# Patient Record
Sex: MALE | Race: WHITE | Employment: OTHER | URBAN - METROPOLITAN AREA
[De-identification: names, ages, dates, MRNs, and addresses within clinical notes are randomized per-mention and may not be internally consistent; named-entity substitution may affect disease eponyms.]

---

## 2019-05-30 ENCOUNTER — APPOINTMENT (OUTPATIENT)
Dept: CT IMAGING | Age: 59
End: 2019-05-30
Attending: PHYSICIAN ASSISTANT
Payer: COMMERCIAL

## 2019-05-30 ENCOUNTER — HOSPITAL ENCOUNTER (EMERGENCY)
Age: 59
Discharge: HOME | End: 2019-05-30
Attending: EMERGENCY MEDICINE
Payer: COMMERCIAL

## 2019-05-30 ENCOUNTER — APPOINTMENT (OUTPATIENT)
Dept: GENERAL RADIOLOGY | Age: 59
End: 2019-05-30
Attending: PHYSICIAN ASSISTANT
Payer: COMMERCIAL

## 2019-05-30 VITALS
HEIGHT: 71 IN | WEIGHT: 160 LBS | SYSTOLIC BLOOD PRESSURE: 148 MMHG | RESPIRATION RATE: 16 BRPM | OXYGEN SATURATION: 100 % | HEART RATE: 63 BPM | TEMPERATURE: 98.3 F | DIASTOLIC BLOOD PRESSURE: 93 MMHG | BODY MASS INDEX: 22.4 KG/M2

## 2019-05-30 DIAGNOSIS — M10.9 ACUTE GOUT OF RIGHT ELBOW, UNSPECIFIED CAUSE: ICD-10-CM

## 2019-05-30 DIAGNOSIS — S09.90XA CLOSED HEAD INJURY, INITIAL ENCOUNTER: ICD-10-CM

## 2019-05-30 DIAGNOSIS — N13.2 URETERAL STONE WITH HYDRONEPHROSIS: ICD-10-CM

## 2019-05-30 DIAGNOSIS — R77.8 ELEVATED TROPONIN: ICD-10-CM

## 2019-05-30 DIAGNOSIS — S32.009A CLOSED FRACTURE OF TRANSVERSE PROCESS OF LUMBAR VERTEBRA, INITIAL ENCOUNTER (HCC): ICD-10-CM

## 2019-05-30 DIAGNOSIS — R42 DIZZINESS: Primary | ICD-10-CM

## 2019-05-30 LAB
ALBUMIN SERPL-MCNC: 3.5 G/DL (ref 3.4–5)
ALBUMIN/GLOB SERPL: 1.4 {RATIO} (ref 0.8–1.7)
ALP SERPL-CCNC: 44 U/L (ref 45–117)
ALT SERPL-CCNC: 20 U/L (ref 16–61)
ANION GAP SERPL CALC-SCNC: 8 MMOL/L (ref 3–18)
APPEARANCE UR: CLEAR
APTT PPP: 25.7 SEC (ref 23–36.4)
AST SERPL-CCNC: 17 U/L (ref 15–37)
ATRIAL RATE: 55 BPM
ATRIAL RATE: 70 BPM
BACTERIA URNS QL MICRO: ABNORMAL /HPF
BASOPHILS # BLD: 0.1 K/UL (ref 0–0.1)
BASOPHILS NFR BLD: 1 % (ref 0–2)
BILIRUB SERPL-MCNC: 0.7 MG/DL (ref 0.2–1)
BILIRUB UR QL: NEGATIVE
BUN SERPL-MCNC: 13 MG/DL (ref 7–18)
BUN/CREAT SERPL: 20 (ref 12–20)
CALCIUM SERPL-MCNC: 8.9 MG/DL (ref 8.5–10.1)
CALCULATED P AXIS, ECG09: 36 DEGREES
CALCULATED P AXIS, ECG09: 73 DEGREES
CALCULATED R AXIS, ECG10: 51 DEGREES
CALCULATED R AXIS, ECG10: 61 DEGREES
CALCULATED T AXIS, ECG11: 43 DEGREES
CALCULATED T AXIS, ECG11: 61 DEGREES
CHLORIDE SERPL-SCNC: 109 MMOL/L (ref 100–108)
CK MB CFR SERPL CALC: 1.7 % (ref 0–4)
CK MB CFR SERPL CALC: 1.8 % (ref 0–4)
CK MB SERPL-MCNC: 3.5 NG/ML (ref 5–25)
CK MB SERPL-MCNC: 3.8 NG/ML (ref 5–25)
CK SERPL-CCNC: 208 U/L (ref 39–308)
CK SERPL-CCNC: 210 U/L (ref 39–308)
CO2 SERPL-SCNC: 27 MMOL/L (ref 21–32)
COLOR UR: YELLOW
CREAT SERPL-MCNC: 0.66 MG/DL (ref 0.6–1.3)
DIAGNOSIS, 93000: NORMAL
DIAGNOSIS, 93000: NORMAL
DIFFERENTIAL METHOD BLD: ABNORMAL
EOSINOPHIL # BLD: 0.1 K/UL (ref 0–0.4)
EOSINOPHIL NFR BLD: 1 % (ref 0–5)
EPITH CASTS URNS QL MICRO: ABNORMAL /LPF (ref 0–5)
ERYTHROCYTE [DISTWIDTH] IN BLOOD BY AUTOMATED COUNT: 13.3 % (ref 11.6–14.5)
GLOBULIN SER CALC-MCNC: 2.5 G/DL (ref 2–4)
GLUCOSE SERPL-MCNC: 111 MG/DL (ref 74–99)
GLUCOSE UR STRIP.AUTO-MCNC: NEGATIVE MG/DL
HCT VFR BLD AUTO: 36.8 % (ref 36–48)
HGB BLD-MCNC: 12.2 G/DL (ref 13–16)
HGB UR QL STRIP: ABNORMAL
INR PPP: 1.1 (ref 0.8–1.2)
KETONES UR QL STRIP.AUTO: NEGATIVE MG/DL
LEUKOCYTE ESTERASE UR QL STRIP.AUTO: NEGATIVE
LYMPHOCYTES # BLD: 1.1 K/UL (ref 0.9–3.6)
LYMPHOCYTES NFR BLD: 12 % (ref 21–52)
MAGNESIUM SERPL-MCNC: 2.2 MG/DL (ref 1.6–2.6)
MCH RBC QN AUTO: 30.2 PG (ref 24–34)
MCHC RBC AUTO-ENTMCNC: 33.2 G/DL (ref 31–37)
MCV RBC AUTO: 91.1 FL (ref 74–97)
MONOCYTES # BLD: 0.9 K/UL (ref 0.05–1.2)
MONOCYTES NFR BLD: 10 % (ref 3–10)
MUCOUS THREADS URNS QL MICRO: POSITIVE /LPF
NEUTS SEG # BLD: 6.9 K/UL (ref 1.8–8)
NEUTS SEG NFR BLD: 76 % (ref 40–73)
NITRITE UR QL STRIP.AUTO: NEGATIVE
P-R INTERVAL, ECG05: 164 MS
P-R INTERVAL, ECG05: 172 MS
PH UR STRIP: 6 [PH] (ref 5–8)
PLATELET # BLD AUTO: 258 K/UL (ref 135–420)
PMV BLD AUTO: 9.3 FL (ref 9.2–11.8)
POTASSIUM SERPL-SCNC: 3.5 MMOL/L (ref 3.5–5.5)
PROT SERPL-MCNC: 6 G/DL (ref 6.4–8.2)
PROT UR STRIP-MCNC: ABNORMAL MG/DL
PROTHROMBIN TIME: 13.6 SEC (ref 11.5–15.2)
Q-T INTERVAL, ECG07: 408 MS
Q-T INTERVAL, ECG07: 428 MS
QRS DURATION, ECG06: 108 MS
QRS DURATION, ECG06: 108 MS
QTC CALCULATION (BEZET), ECG08: 409 MS
QTC CALCULATION (BEZET), ECG08: 440 MS
RBC # BLD AUTO: 4.04 M/UL (ref 4.7–5.5)
RBC #/AREA URNS HPF: >100 /HPF (ref 0–5)
SODIUM SERPL-SCNC: 144 MMOL/L (ref 136–145)
SP GR UR REFRACTOMETRY: 1.02 (ref 1–1.03)
TROPONIN I SERPL-MCNC: 0.07 NG/ML (ref 0–0.04)
TROPONIN I SERPL-MCNC: 0.09 NG/ML (ref 0–0.04)
UROBILINOGEN UR QL STRIP.AUTO: 1 EU/DL (ref 0.2–1)
VENTRICULAR RATE, ECG03: 55 BPM
VENTRICULAR RATE, ECG03: 70 BPM
WBC # BLD AUTO: 9 K/UL (ref 4.6–13.2)
WBC URNS QL MICRO: ABNORMAL /HPF (ref 0–5)

## 2019-05-30 PROCEDURE — 74011250636 HC RX REV CODE- 250/636

## 2019-05-30 PROCEDURE — 93005 ELECTROCARDIOGRAM TRACING: CPT

## 2019-05-30 PROCEDURE — 80053 COMPREHEN METABOLIC PANEL: CPT

## 2019-05-30 PROCEDURE — 96374 THER/PROPH/DIAG INJ IV PUSH: CPT

## 2019-05-30 PROCEDURE — 87086 URINE CULTURE/COLONY COUNT: CPT

## 2019-05-30 PROCEDURE — 81001 URINALYSIS AUTO W/SCOPE: CPT

## 2019-05-30 PROCEDURE — 74011250637 HC RX REV CODE- 250/637: Performed by: PHYSICIAN ASSISTANT

## 2019-05-30 PROCEDURE — 70450 CT HEAD/BRAIN W/O DYE: CPT

## 2019-05-30 PROCEDURE — 72125 CT NECK SPINE W/O DYE: CPT

## 2019-05-30 PROCEDURE — 82550 ASSAY OF CK (CPK): CPT

## 2019-05-30 PROCEDURE — 71045 X-RAY EXAM CHEST 1 VIEW: CPT

## 2019-05-30 PROCEDURE — 85610 PROTHROMBIN TIME: CPT

## 2019-05-30 PROCEDURE — 99285 EMERGENCY DEPT VISIT HI MDM: CPT

## 2019-05-30 PROCEDURE — 96361 HYDRATE IV INFUSION ADD-ON: CPT

## 2019-05-30 PROCEDURE — 85730 THROMBOPLASTIN TIME PARTIAL: CPT

## 2019-05-30 PROCEDURE — 74176 CT ABD & PELVIS W/O CONTRAST: CPT

## 2019-05-30 PROCEDURE — 85025 COMPLETE CBC W/AUTO DIFF WBC: CPT

## 2019-05-30 PROCEDURE — 83735 ASSAY OF MAGNESIUM: CPT

## 2019-05-30 PROCEDURE — 74011250636 HC RX REV CODE- 250/636: Performed by: PHYSICIAN ASSISTANT

## 2019-05-30 RX ORDER — OXYCODONE AND ACETAMINOPHEN 5; 325 MG/1; MG/1
1 TABLET ORAL
Status: COMPLETED | OUTPATIENT
Start: 2019-05-30 | End: 2019-05-30

## 2019-05-30 RX ORDER — ONDANSETRON 4 MG/1
4 TABLET, ORALLY DISINTEGRATING ORAL
Status: COMPLETED | OUTPATIENT
Start: 2019-05-30 | End: 2019-05-30

## 2019-05-30 RX ORDER — KETOROLAC TROMETHAMINE 30 MG/ML
30 INJECTION, SOLUTION INTRAMUSCULAR; INTRAVENOUS
Status: COMPLETED | OUTPATIENT
Start: 2019-05-30 | End: 2019-05-30

## 2019-05-30 RX ORDER — MECLIZINE HCL 12.5 MG 12.5 MG/1
25 TABLET ORAL
Status: COMPLETED | OUTPATIENT
Start: 2019-05-30 | End: 2019-05-30

## 2019-05-30 RX ORDER — SCOLOPAMINE TRANSDERMAL SYSTEM 1 MG/1
1 PATCH, EXTENDED RELEASE TRANSDERMAL ONCE
Status: DISCONTINUED | OUTPATIENT
Start: 2019-05-30 | End: 2019-05-30 | Stop reason: HOSPADM

## 2019-05-30 RX ORDER — MECLIZINE HCL 12.5 MG 12.5 MG/1
TABLET ORAL
Status: COMPLETED
Start: 2019-05-30 | End: 2019-05-30

## 2019-05-30 RX ORDER — KETOROLAC TROMETHAMINE 30 MG/ML
INJECTION, SOLUTION INTRAMUSCULAR; INTRAVENOUS
Status: COMPLETED
Start: 2019-05-30 | End: 2019-05-30

## 2019-05-30 RX ADMIN — SODIUM CHLORIDE 1000 ML: 900 INJECTION, SOLUTION INTRAVENOUS at 10:43

## 2019-05-30 RX ADMIN — KETOROLAC TROMETHAMINE 30 MG: 30 INJECTION, SOLUTION INTRAMUSCULAR at 13:04

## 2019-05-30 RX ADMIN — MECLIZINE 25 MG: 12.5 TABLET ORAL at 13:05

## 2019-05-30 RX ADMIN — OXYCODONE HYDROCHLORIDE AND ACETAMINOPHEN 1 TABLET: 5; 325 TABLET ORAL at 18:07

## 2019-05-30 RX ADMIN — SODIUM CHLORIDE 1000 ML: 900 INJECTION, SOLUTION INTRAVENOUS at 13:05

## 2019-05-30 RX ADMIN — MECLIZINE HCL 12.5 MG 25 MG: 12.5 TABLET ORAL at 13:05

## 2019-05-30 RX ADMIN — KETOROLAC TROMETHAMINE 30 MG: 30 INJECTION, SOLUTION INTRAMUSCULAR; INTRAVENOUS at 13:04

## 2019-05-30 RX ADMIN — ONDANSETRON 4 MG: 4 TABLET, ORALLY DISINTEGRATING ORAL at 18:09

## 2019-05-30 NOTE — ED TRIAGE NOTES
Patient noted to have 2 witnessed falls this am. Patient with history of ALS. Patient denies striking head and had dizziness upon sitting up.  Patient also with complaints of right arm pain from gout

## 2019-05-30 NOTE — ED PROVIDER NOTES
EMERGENCY DEPARTMENT HISTORY AND PHYSICAL EXAM    Date: 5/30/2019  Patient Name: Gera Carroll    History of Presenting Illness     Chief Complaint   Patient presents with    Fall         History Provided By: Patient    Chief Complaint: fall    HPI(Context):   9:43 AM  Gera Carroll is a 60 y.o. male with PMHX of ALS, gout who presents to the emergency department C/O fall. Associated sxs include mild headache, dizziness, and soreness to neck. Pt states 2 hours ago, while ambulating from bed to restroom, pt reports he \"lost my balance\" and he fell backwards hitting back of head against floor. No LOC. Pt reports he felt dizzy after fall with sitting up. Dizzy in emergency room during HPI. Guillermo Leach Pt states he feels dehydrated. Mild occipital headache and neck is sore with movement. No back pain. Pt reports R elbow pain x 2 days that he feels is related to recurrent gout. Pt has hx of ALS. Followed by [facility] neurology (Dr. Robin Mcguire). Pt lives alone but reports home health visits 3 times a day. He states he has a lot of friends that check on him and his mother lives in [place], [place]. Pt denies chest pain, SOB, fever, dysuria, rash, and any other sxs or complaints. PCP: Beverly Modi DO        Past History     Past Medical History:  Past Medical History:   Diagnosis Date    [facility]     [facility]otrophic lateral sclerosis) [facility] [address] [facility]     Gout        Past Surgical History:  Past Surgical History:   Procedure Laterality Date    HX KNEE REPLACEMENT      right       Family History:  History reviewed. No pertinent family history. Social History:  Social History     Tobacco Use    Smoking status: Never Smoker    Smokeless tobacco: Never Used   Substance Use Topics    Alcohol use: Yes    Drug use: Never       Allergies:  No Known Allergies      Review of Systems   Review of Systems   Constitutional: Negative for fever.         Feels dehydrated     Respiratory: Negative for cough and shortness of breath. Cardiovascular: Negative for chest pain. Gastrointestinal: Negative for abdominal pain, nausea and vomiting. Genitourinary: Negative for dysuria. Musculoskeletal: Positive for arthralgias (right elbow, hx of gout) and joint swelling. Skin: Negative for color change. Neurological: Positive for dizziness and headaches. Psychiatric/Behavioral: Negative for confusion. All other systems reviewed and are negative. Physical Exam     Vitals:    05/30/19 0957 05/30/19 1100 05/30/19 1145 05/30/19 1215   BP: 148/78 145/85 (!) 134/91 138/65   Pulse: (!) 58 61 62 64   Resp: 11 10 13 13   Temp: 98.3 °F (36.8 °C)      SpO2: 100% 99% 99% 98%   Weight: 72.6 kg (160 lb)      Height: 5' 11\" (1.803 m)        Physical Exam   Constitutional: He is oriented to person, place, and time. He appears well-developed and well-nourished.  male in NAD. Alert. Answering questions. Follows directions. Slurred speech (chronic)   HENT:   Head: Normocephalic and atraumatic. Head is without Chatman's sign, without abrasion and without contusion. Right Ear: External ear normal. No drainage or swelling. Tympanic membrane is not perforated, not erythematous and not bulging. Left Ear: External ear normal. No drainage or swelling. Tympanic membrane is not perforated, not erythematous and not bulging. Nose: Nose normal.   Mouth/Throat: Uvula is midline. Mucous membranes are dry. Eyes: Pupils are equal, round, and reactive to light. Conjunctivae and EOM are normal. No scleral icterus. Neck: Normal range of motion. No spinous process tenderness and no muscular tenderness present. Cardiovascular: Normal rate, regular rhythm, normal heart sounds and intact distal pulses. Exam reveals no gallop and no friction rub. No murmur heard. Pulmonary/Chest: Effort normal and breath sounds normal. No accessory muscle usage. No tachypnea. No respiratory distress. He has no decreased breath sounds. He has no wheezes. He has no rhonchi. He has no rales. Abdominal: Soft. Normal appearance and bowel sounds are normal. He exhibits no distension. There is no tenderness. There is CVA tenderness (left CVA tenderness). There is no rigidity, no rebound, no guarding and no tenderness at McBurney's point. Musculoskeletal:        Right elbow: He exhibits swelling. He exhibits normal range of motion. Tenderness found. Left hip: He exhibits normal range of motion and no tenderness. Cervical back: He exhibits tenderness. He exhibits normal range of motion. Thoracic back: He exhibits tenderness. He exhibits normal range of motion. Lumbar back: He exhibits normal range of motion, no tenderness, no bony tenderness and no swelling. Right upper leg: He exhibits no tenderness, no bony tenderness and no swelling. Left upper leg: He exhibits no tenderness. Right lower leg: He exhibits no tenderness. Left lower leg: He exhibits no tenderness. Neurological: He is alert and oriented to person, place, and time. He displays no tremor. No sensory deficit. 2/5 strength in BUE and BLE   Skin: Skin is warm and dry. He is not diaphoretic. Psychiatric: He has a normal mood and affect. Judgment normal.   Nursing note and vitals reviewed.           Diagnostic Study Results     Labs -     Recent Results (from the past 12 hour(s))   EKG, 12 LEAD, INITIAL    Collection Time: 05/30/19 10:41 AM   Result Value Ref Range    Ventricular Rate 55 BPM    Atrial Rate 55 BPM    P-R Interval 164 ms    QRS Duration 108 ms    Q-T Interval 428 ms    QTC Calculation (Bezet) 409 ms    Calculated P Axis 36 degrees    Calculated R Axis 51 degrees    Calculated T Axis 43 degrees    Diagnosis       Sinus bradycardia  Otherwise normal ECG  Confirmed by Sourav Espinosa MD, [facility] ([id number]) on 5/30/2019 11:13:18 AM     CBC WITH AUTOMATED DIFF    Collection Time: 05/30/19 10:45 AM   Result Value Ref Range    WBC 9.0 4.6 - 13.2 K/uL    RBC 4.04 (L) 4.70 - 5.50 M/uL    HGB 12.2 (L) 13.0 - 16.0 g/dL    HCT 36.8 36.0 - 48.0 %    MCV 91.1 74.0 - 97.0 FL    MCH 30.2 24.0 - 34.0 PG    MCHC 33.2 31.0 - 37.0 g/dL    RDW 13.3 11.6 - 14.5 %    PLATELET 838 981 - 435 K/uL    MPV 9.3 9.2 - 11.8 FL    NEUTROPHILS 76 (H) 40 - 73 %    LYMPHOCYTES 12 (L) 21 - 52 %    MONOCYTES 10 3 - 10 %    EOSINOPHILS 1 0 - 5 %    BASOPHILS 1 0 - 2 %    ABS. NEUTROPHILS 6.9 1.8 - 8.0 K/UL    ABS. LYMPHOCYTES 1.1 0.9 - 3.6 K/UL    ABS. MONOCYTES 0.9 0.05 - 1.2 K/UL    ABS. EOSINOPHILS 0.1 0.0 - 0.4 K/UL    ABS. BASOPHILS 0.1 0.0 - 0.1 K/UL    DF AUTOMATED     METABOLIC PANEL, COMPREHENSIVE    Collection Time: 05/30/19 10:45 AM   Result Value Ref Range    Sodium 144 136 - 145 mmol/L    Potassium 3.5 3.5 - 5.5 mmol/L    Chloride 109 (H) 100 - 108 mmol/L    CO2 27 21 - 32 mmol/L    Anion gap 8 3.0 - 18 mmol/L    Glucose 111 (H) 74 - 99 mg/dL    BUN 13 7.0 - 18 MG/DL    Creatinine 0.66 0.6 - 1.3 MG/DL    BUN/Creatinine ratio 20 12 - 20      GFR est AA >60 >60 ml/min/1.73m2    GFR est non-AA >60 >60 ml/min/1.73m2    Calcium 8.9 8.5 - 10.1 MG/DL    Bilirubin, total 0.7 0.2 - 1.0 MG/DL    ALT (SGPT) 20 16 - 61 U/L    AST (SGOT) 17 15 - 37 U/L    Alk.  phosphatase 44 (L) 45 - 117 U/L    Protein, total 6.0 (L) 6.4 - 8.2 g/dL    Albumin 3.5 3.4 - 5.0 g/dL    Globulin 2.5 2.0 - 4.0 g/dL    A-G Ratio 1.4 0.8 - 1.7     PROTHROMBIN TIME + INR    Collection Time: 05/30/19 10:45 AM   Result Value Ref Range    Prothrombin time 13.6 11.5 - 15.2 sec    INR 1.1 0.8 - 1.2     PTT    Collection Time: 05/30/19 10:45 AM   Result Value Ref Range    aPTT 25.7 23.0 - 36.4 SEC   MAGNESIUM    Collection Time: 05/30/19 10:45 AM   Result Value Ref Range    Magnesium 2.2 1.6 - 2.6 mg/dL   CARDIAC PANEL,(CK, CKMB & TROPONIN)    Collection Time: 05/30/19 10:45 AM   Result Value Ref Range     39 - 308 U/L    CK - MB 3.8 (H) <3.6 ng/ml    CK-MB Index 1.8 0.0 - 4.0 %    Troponin-I, QT 0.07 (H) 0.0 - 0.045 NG/ML URINALYSIS W/ RFLX MICROSCOPIC    Collection Time: 05/30/19 11:30 AM   Result Value Ref Range    Color YELLOW      Appearance CLEAR      Specific gravity 1.022 1.005 - 1.030      pH (UA) 6.0 5.0 - 8.0      Protein TRACE (A) NEG mg/dL    Glucose NEGATIVE  NEG mg/dL    Ketone NEGATIVE  NEG mg/dL    Bilirubin NEGATIVE  NEG      Blood LARGE (A) NEG      Urobilinogen 1.0 0.2 - 1.0 EU/dL    Nitrites NEGATIVE  NEG      Leukocyte Esterase NEGATIVE  NEG     URINE MICROSCOPIC ONLY    Collection Time: 05/30/19 11:30 AM   Result Value Ref Range    WBC 0 to 3 0 - 5 /hpf    RBC >100 (H) 0 - 5 /hpf    Epithelial cells FEW 0 - 5 /lpf    Bacteria FEW (A) NEG /hpf    Mucus POSITIVE (A) NEG /lpf   CARDIAC PANEL,(CK, CKMB & TROPONIN)    Collection Time: 05/30/19  2:29 PM   Result Value Ref Range     39 - 308 U/L    CK - MB 3.5 <3.6 ng/ml    CK-MB Index 1.7 0.0 - 4.0 %    Troponin-I, QT 0.09 (H) 0.0 - 0.045 NG/ML   EKG, 12 LEAD, SUBSEQUENT    Collection Time: 05/30/19  2:29 PM   Result Value Ref Range    Ventricular Rate 70 BPM    Atrial Rate 70 BPM    P-R Interval 172 ms    QRS Duration 108 ms    Q-T Interval 408 ms    QTC Calculation (Bezet) 440 ms    Calculated P Axis 73 degrees    Calculated R Axis 61 degrees    Calculated T Axis 61 degrees    Diagnosis       Normal sinus rhythm  Normal ECG  When compared with ECG of 30-MAY-2019 10:41,  No significant change was found           CT ABD PELV WO CONT   Final Result   IMPRESSION:      1. Mild left-sided hydroureteronephrosis with a 4 mm stone present at the left   ureterovesicular junction      2. Multiple small layering urinary bladder stones. 3. Subacute appearing left transverse process fracture of the L1 vertebral body. CT SPINE CERV WO CONT   Final Result   IMPRESSION:       No acute osseous injuries. Degenerative/chronic changes as noted. CT HEAD WO CONT   Final Result   IMPRESSION:         1. No acute intracranial abnormality.    2. Moderately large superior left scalp hematoma. No associated fracture or   retained radiopaque foreign object. XR CHEST PORT   Final Result   IMPRESSION:      No acute radiographic cardiopulmonary abnormality. CT Results  (Last 48 hours)               05/30/19 1422  CT ABD PELV WO CONT Final result    Impression:  IMPRESSION:       1. Mild left-sided hydroureteronephrosis with a 4 mm stone present at the left   ureterovesicular junction       2. Multiple small layering urinary bladder stones. 3. Subacute appearing left transverse process fracture of the L1 vertebral body. Narrative:  EXAM: CT of the abdomen and pelvis       INDICATION: Back pain, fall, hematuria       COMPARISON: None. TECHNIQUE: Axial CT imaging of the abdomen and pelvis was performed without oral   or intravenous contrast. Multiplanar reformats were generated. One or more dose reduction techniques were used on this CT: automated exposure   control, adjustment of the mAs and/or kVp according to patient size, and   iterative reconstruction techniques. The specific techniques used on this CT   exam have been documented in the patient's electronic medical record. Digital   Imaging and Communications in Medicine (DICOM) format image data are available   to nonaffiliated external healthcare facilities or entities on a secure, media   free, reciprocally searchable basis with patient authorization for at least a   12-month period after this study. _______________       FINDINGS:       LOWER CHEST: Minor dependent atelectasis is present at the lung bases. No   pleural effusion or alveolar consolidation. Normal cardiac size without   pericardial effusion. LIVER, BILIARY: Unenhanced appearance of the liver demonstrates no focal   abnormality. No biliary dilation. Gallbladder is unremarkable. PANCREAS: Normal unenhanced appearance.        SPLEEN: Normal.       ADRENALS: Normal.       KIDNEYS/URETERS/BLADDER: Mild left hydroureteronephrosis is present with a 4 mm   stone at the ureterovesicular junction. Additional somewhat linear-appearing   intraparenchymal calcification noted within the upper pole of the left kidney. No right-sided hydronephrosis or urolithiasis. Layering densities are   demonstrated within the urinary bladder. PELVIC ORGANS: Dystrophic calcifications are present within the prostate. VASCULATURE: Minor aortobiiliac atherosclerotic calcification is present without   evidence of aneurysmal dilatation       LYMPH NODES: Scattered subcentimeter mesenteric and retroperitoneal lymph nodes   are present without evidence of abdominal or pelvic adenopathy. GASTROINTESTINAL TRACT: No bowel dilation or wall thickening. Normal appendix. No bowel obstruction. Large burden of formed stool in the rectum. BONES: No suspicious lytic or blastic osseous lesions. Multilevel lumbar   spondylosis and lower lumbar facet joint osteoarthrosis present. Minimally   displaced, subacute appearing left L1 transverse process fracture. OTHER: None.       _______________           05/30/19 1126  CT SPINE CERV WO CONT Final result    Impression:  IMPRESSION:        No acute osseous injuries. Degenerative/chronic changes as noted. Narrative:  INDICATION: Neck pain, trauma       COMPARISON: None. TECHNIQUE:  Unenhanced multislice helical CT of the cervical spine was performed   in the axial plane. Sagittal and coronal reconstructions were obtained. One or more dose reduction techniques were used on this CT: automated exposure   control, adjustment of the mAs and/or kVp according to patient size, and   iterative reconstruction techniques. The specific techniques used on this CT   exam have been documented in the patient's electronic medical record.   Digital   Imaging and Communications in Medicine (DICOM) format image data are available   to nonaffiliated external healthcare facilities or entities on a secure, media   free, reciprocally searchable basis with patient authorization for at least a   12-month period after this study. FINDINGS:    No acute fracture identified or traumatic subluxation. There is approximately 2   mm retrolisthesis of C5 on C6, otherwise normal alignment. No high-grade spinal   canal or foraminal bony impingement appreciated. Degenerative disc changes are   present at C5-6 with loss of disc height and disc osteophyte formation. Paraspinal soft tissues unremarkable. 05/30/19 1124  CT HEAD WO CONT Final result    Impression:  IMPRESSION:           1. No acute intracranial abnormality. 2. Moderately large superior left scalp hematoma. No associated fracture or   retained radiopaque foreign object. Narrative:  EXAM: CT head       INDICATION: Headache status post fall, dizziness       COMPARISON: None. TECHNIQUE: Axial CT imaging of the head was performed without intravenous   contrast.       One or more dose reduction techniques were used on this CT: automated exposure   control, adjustment of the mAs and/or kVp according to patient size, and   iterative reconstruction techniques. The specific techniques used on this CT   exam have been documented in the patient's electronic medical record. Digital   Imaging and Communications in Medicine (DICOM) format image data are available   to nonaffiliated external healthcare facilities or entities on a secure, media   free, reciprocally searchable basis with patient authorization for at least a   12-month period after this study. _______________       FINDINGS:       BRAIN AND POSTERIOR FOSSA: Mild cortical and cerebellar volume loss is present. Ventricular size and configuration is within normal limits. Basilar cisterns   appear patent. There is no intracranial hemorrhage, mass effect, or midline   shift. Gray-white matter differentiation is within normal limits.        EXTRA-AXIAL SPACES AND MENINGES: No acute extra-axial fluid collection. CALVARIUM: No acute osseous abnormality. SINUSES: Imaged paranasal sinuses and mastoid air cells are clear. OTHER: Moderately large left-sided scalp hematoma. No retained radiopaque   foreign object.       _______________               CXR Results  (Last 48 hours)               05/30/19 1034  XR CHEST PORT Final result    Impression:  IMPRESSION:       No acute radiographic cardiopulmonary abnormality. Narrative:  EXAM: XR CHEST PORT       CLINICAL INDICATION/HISTORY: Dizziness   -Additional: None       COMPARISON: None       TECHNIQUE: Frontal view of the chest       _______________       FINDINGS:       HEART AND MEDIASTINUM: Normal cardiac size. Mild tortuosity of the thoracic   aorta is present. LUNGS AND PLEURAL SPACES: No focal pneumonic consolidation, pneumothorax, or   pleural effusion. BONY THORAX AND SOFT TISSUES: No acute osseous abnormality       _______________                 Medications given in the ED-  Medications   scopolamine (TRANSDERM-SCOP) 1 mg over 3 days 1 Patch (1 Patch TransDERmal Apply Patch 5/30/19 1533)   sodium chloride 0.9 % bolus infusion 1,000 mL (0 mL IntraVENous IV Completed 5/30/19 1147)   sodium chloride 0.9 % bolus infusion 1,000 mL (0 mL IntraVENous IV Completed 5/30/19 1416)   meclizine (ANTIVERT) tablet 25 mg (25 mg Oral Given 5/30/19 1305)   ketorolac (TORADOL) injection 30 mg (30 mg IntraVENous Given 5/30/19 1304)         Medical Decision Making   I am the first provider for this patient. I reviewed the vital signs, available nursing notes, past medical history, past surgical history, family history and social history. Vital Signs-Reviewed the patient's vital signs.     Pulse Oximetry Analysis - 100% on RA     Records Reviewed: Nursing Notes    Provider Notes (Medical Decision Making): ACS/MI, arrhythmia, dehydration, metabolic derangement, anemia, hypoglycemia, vasovagal, UTI, concussion, skull fx, intracranial bleed. Doubt CVA/TIA    FACE-TO-FACE PROGRESS NOTE:  2:00 PM  The patient presents with headache and neck pain after a fall. Patient has a history of ALS. Patient reports that he lost his balance when attempting to get up from bed. My exam shows chronically ill-appearing man who does not appear to be in acute distress. No external signs of injury. Imp/plan: Troponin slightly elevated 0.07. Will repeat troponin. Patient reporting mild dizziness. Will treat symptomatically and attempt to ambulate. I have personally seen and examined the patient, reviewed the AUGUSTIN's note and agree with findings and plan. Written by Susna Kelley DO    Procedures:  Procedures    ED Course:   9:43 AM Initial assessment performed. The patients presenting problems have been discussed, and they are in agreement with the care plan formulated and outlined with them. I have encouraged them to ask questions as they arise throughout their visit. Diagnosis and Disposition       3:25 PM  Still very dizzy. Not able to stand and asking to lay back down. 2L IVF given. Meclizine and scopolamine with no relief. No chest pain or SOB complaints but trop rising on repeat. EKG unremarkable. After discussion with attending, pt will likely need admission. No cardiology coverage at this hospital today. Pt actually requests to go to [facility] as his neurologist Dr. Vladimir Patiño is there. Will consult with [facility] neurology    3:43 PM Consult: I discussed care with Dr. Sadia Webb [facility] neurologist). It was a standard discussion, including history of patients chief complaint, available diagnostic results, and treatment course. He agrees pt likely needs admission for intractable dizziness in setting of ALS. Will consult with [facility] cardiologist as no cardiology coverage here.      4:20 PM Consult: I discussed care with Dr. Jeffrey Naqvi [facility] ED attending) It was a standard discussion, including history of patients chief complaint, available diagnostic results, and treatment course. She will accept pt. Reports hospital is full so patient will likely sit in ED room for 24 hours. Pt and friend aware of this. 1. Dizziness    2. Elevated troponin    3. Closed head injury, initial encounter    4. Ureteral stone with hydronephrosis    5. Closed fracture of transverse process of lumbar vertebra, initial encounter ([address]    6. Acute gout of right elbow, unspecified cause        PLAN:  1. Transfer to [facility] ED to ED with accepting Dr. Jose Parham: This note is prepared by Jose Manuel Romero PA-C.  _______________________________    4:24 PM  I have spent 47 minutes of critical care time involved in lab review, consultations with specialist, family decision-making, and documentation. During this entire length of time I was immediately available to the patient. Critical Care: The reason for providing this level of medical care for this critically ill patient was due a critical illness that impaired one or more vital organ systems such that there was a high probability of imminent or life threatening deterioration in the patients condition. This care involved high complexity decision making to assess, manipulate, and support vital system functions, to treat this degreee vital organ system failure and to prevent further life threatening deterioration of the patients condition. Please note that this dictation was completed with [employer], the computer voice recognition software. Quite often unanticipated grammatical, syntax, homophones, and other interpretive errors are inadvertently transcribed by the computer software. Please disregard these errors. Please excuse any errors that have escaped final proofreading.

## 2019-05-30 NOTE — PROGRESS NOTES
Chart reviewed cm consult received for [facility], cm met with pt at bedside along with [address] explained reason for visit, pt informed cm he lives alone at [facility] living, facility provides all cooked meals, pt also uses [facility] for personal care services with 3 visits per day @ 9am,3pm and 8pm, pt stated he still ambulates which is becoming more difficult pt has an motorized w/c that's not working but has workup order,has rollator,pt listed as self pay, POA aware and plans to call registration once home to provide insurance information,no further cm needs at this time but will remain available if needed.

## 2019-05-30 NOTE — ED NOTES
Patient assisted to seated position by Nurse. Patient reported \" I'm dizzy , I'm dizzy\". Patient assisted back to supine position stated \" that's better\".
Patient being transferred with [facility] at this time  LDA removed in [facility] [place] for documentation purposes only. Patient admitted to hospital with; site 1- Peripheral IV, which at time of admission is Clean, Dry, and intact, no signs or symptoms of phlebitis. No signs or symptoms of infiltration, Patent and Clamped.
17-Oct-2018 03:36

## 2019-06-01 LAB
BACTERIA SPEC CULT: NORMAL
SERVICE CMNT-IMP: NORMAL